# Patient Record
Sex: MALE | Race: WHITE | NOT HISPANIC OR LATINO | URBAN - METROPOLITAN AREA
[De-identification: names, ages, dates, MRNs, and addresses within clinical notes are randomized per-mention and may not be internally consistent; named-entity substitution may affect disease eponyms.]

---

## 2020-02-15 ENCOUNTER — EMERGENCY (EMERGENCY)
Facility: HOSPITAL | Age: 50
LOS: 1 days | Discharge: HOME | End: 2020-02-15
Attending: EMERGENCY MEDICINE
Payer: SELF-PAY

## 2020-02-15 VITALS
DIASTOLIC BLOOD PRESSURE: 87 MMHG | WEIGHT: 160.06 LBS | RESPIRATION RATE: 20 BRPM | SYSTOLIC BLOOD PRESSURE: 144 MMHG | HEART RATE: 80 BPM | OXYGEN SATURATION: 97 % | TEMPERATURE: 97 F | HEIGHT: 69 IN

## 2020-02-15 VITALS
OXYGEN SATURATION: 97 % | HEART RATE: 60 BPM | RESPIRATION RATE: 18 BRPM | TEMPERATURE: 98 F | SYSTOLIC BLOOD PRESSURE: 137 MMHG | DIASTOLIC BLOOD PRESSURE: 81 MMHG

## 2020-02-15 LAB
ALBUMIN SERPL ELPH-MCNC: 4.5 G/DL — SIGNIFICANT CHANGE UP (ref 3.3–5.2)
ALP SERPL-CCNC: 51 U/L — SIGNIFICANT CHANGE UP (ref 40–120)
ALT FLD-CCNC: 14 U/L — SIGNIFICANT CHANGE UP
ANION GAP SERPL CALC-SCNC: 13 MMOL/L — SIGNIFICANT CHANGE UP (ref 5–17)
APTT BLD: 30.2 SEC — SIGNIFICANT CHANGE UP (ref 27.5–36.3)
AST SERPL-CCNC: 17 U/L — SIGNIFICANT CHANGE UP
BILIRUB SERPL-MCNC: 0.4 MG/DL — SIGNIFICANT CHANGE UP (ref 0.4–2)
BUN SERPL-MCNC: 10 MG/DL — SIGNIFICANT CHANGE UP (ref 8–20)
CALCIUM SERPL-MCNC: 9.5 MG/DL — SIGNIFICANT CHANGE UP (ref 8.6–10.2)
CHLORIDE SERPL-SCNC: 100 MMOL/L — SIGNIFICANT CHANGE UP (ref 98–107)
CO2 SERPL-SCNC: 25 MMOL/L — SIGNIFICANT CHANGE UP (ref 22–29)
CREAT SERPL-MCNC: 0.65 MG/DL — SIGNIFICANT CHANGE UP (ref 0.5–1.3)
GLUCOSE SERPL-MCNC: 101 MG/DL — HIGH (ref 70–99)
HCT VFR BLD CALC: 53.2 % — HIGH (ref 39–50)
HGB BLD-MCNC: 17.1 G/DL — HIGH (ref 13–17)
INR BLD: 0.97 RATIO — SIGNIFICANT CHANGE UP (ref 0.88–1.16)
LACTATE BLDV-MCNC: 1.2 MMOL/L — SIGNIFICANT CHANGE UP (ref 0.5–2)
MCHC RBC-ENTMCNC: 30.3 PG — SIGNIFICANT CHANGE UP (ref 27–34)
MCHC RBC-ENTMCNC: 32.1 GM/DL — SIGNIFICANT CHANGE UP (ref 32–36)
MCV RBC AUTO: 94.3 FL — SIGNIFICANT CHANGE UP (ref 80–100)
NT-PROBNP SERPL-SCNC: 47 PG/ML — SIGNIFICANT CHANGE UP (ref 0–300)
PLATELET # BLD AUTO: 222 K/UL — SIGNIFICANT CHANGE UP (ref 150–400)
POTASSIUM SERPL-MCNC: 4.8 MMOL/L — SIGNIFICANT CHANGE UP (ref 3.5–5.3)
POTASSIUM SERPL-SCNC: 4.8 MMOL/L — SIGNIFICANT CHANGE UP (ref 3.5–5.3)
PROT SERPL-MCNC: 7.1 G/DL — SIGNIFICANT CHANGE UP (ref 6.6–8.7)
PROTHROM AB SERPL-ACNC: 11 SEC — SIGNIFICANT CHANGE UP (ref 10–12.9)
RBC # BLD: 5.64 M/UL — SIGNIFICANT CHANGE UP (ref 4.2–5.8)
RBC # FLD: 12.4 % — SIGNIFICANT CHANGE UP (ref 10.3–14.5)
SODIUM SERPL-SCNC: 138 MMOL/L — SIGNIFICANT CHANGE UP (ref 135–145)
TROPONIN T SERPL-MCNC: <0.01 NG/ML — SIGNIFICANT CHANGE UP (ref 0–0.06)
WBC # BLD: 5.46 K/UL — SIGNIFICANT CHANGE UP (ref 3.8–10.5)
WBC # FLD AUTO: 5.46 K/UL — SIGNIFICANT CHANGE UP (ref 3.8–10.5)

## 2020-02-15 PROCEDURE — 73630 X-RAY EXAM OF FOOT: CPT | Mod: 26,LT

## 2020-02-15 PROCEDURE — 36415 COLL VENOUS BLD VENIPUNCTURE: CPT

## 2020-02-15 PROCEDURE — 99284 EMERGENCY DEPT VISIT MOD MDM: CPT | Mod: 25

## 2020-02-15 PROCEDURE — G0378: CPT

## 2020-02-15 PROCEDURE — 96375 TX/PRO/DX INJ NEW DRUG ADDON: CPT

## 2020-02-15 PROCEDURE — 93971 EXTREMITY STUDY: CPT

## 2020-02-15 PROCEDURE — 80053 COMPREHEN METABOLIC PANEL: CPT

## 2020-02-15 PROCEDURE — 86140 C-REACTIVE PROTEIN: CPT

## 2020-02-15 PROCEDURE — 84484 ASSAY OF TROPONIN QUANT: CPT

## 2020-02-15 PROCEDURE — 85730 THROMBOPLASTIN TIME PARTIAL: CPT

## 2020-02-15 PROCEDURE — 96365 THER/PROPH/DIAG IV INF INIT: CPT

## 2020-02-15 PROCEDURE — 85027 COMPLETE CBC AUTOMATED: CPT

## 2020-02-15 PROCEDURE — 85610 PROTHROMBIN TIME: CPT

## 2020-02-15 PROCEDURE — 85652 RBC SED RATE AUTOMATED: CPT

## 2020-02-15 PROCEDURE — 83605 ASSAY OF LACTIC ACID: CPT

## 2020-02-15 PROCEDURE — 93971 EXTREMITY STUDY: CPT | Mod: 26,LT

## 2020-02-15 PROCEDURE — 87040 BLOOD CULTURE FOR BACTERIA: CPT

## 2020-02-15 PROCEDURE — 73630 X-RAY EXAM OF FOOT: CPT

## 2020-02-15 PROCEDURE — 83880 ASSAY OF NATRIURETIC PEPTIDE: CPT

## 2020-02-15 PROCEDURE — 99218: CPT

## 2020-02-15 RX ORDER — CEPHALEXIN 500 MG
1 CAPSULE ORAL
Qty: 28 | Refills: 0
Start: 2020-02-15 | End: 2020-02-21

## 2020-02-15 RX ORDER — PIPERACILLIN AND TAZOBACTAM 4; .5 G/20ML; G/20ML
3.38 INJECTION, POWDER, LYOPHILIZED, FOR SOLUTION INTRAVENOUS ONCE
Refills: 0 | Status: COMPLETED | OUTPATIENT
Start: 2020-02-15 | End: 2020-02-15

## 2020-02-15 RX ORDER — KETOROLAC TROMETHAMINE 30 MG/ML
15 SYRINGE (ML) INJECTION ONCE
Refills: 0 | Status: DISCONTINUED | OUTPATIENT
Start: 2020-02-15 | End: 2020-02-15

## 2020-02-15 RX ORDER — CEPHALEXIN 500 MG
500 CAPSULE ORAL ONCE
Refills: 0 | Status: COMPLETED | OUTPATIENT
Start: 2020-02-15 | End: 2020-02-15

## 2020-02-15 RX ORDER — NICOTINE POLACRILEX 2 MG
1 GUM BUCCAL DAILY
Refills: 0 | Status: DISCONTINUED | OUTPATIENT
Start: 2020-02-15 | End: 2020-02-22

## 2020-02-15 RX ORDER — IBUPROFEN 200 MG
600 TABLET ORAL EVERY 6 HOURS
Refills: 0 | Status: DISCONTINUED | OUTPATIENT
Start: 2020-02-15 | End: 2020-02-22

## 2020-02-15 RX ORDER — CEFAZOLIN SODIUM 1 G
1000 VIAL (EA) INJECTION EVERY 8 HOURS
Refills: 0 | Status: DISCONTINUED | OUTPATIENT
Start: 2020-02-15 | End: 2020-02-22

## 2020-02-15 RX ADMIN — PIPERACILLIN AND TAZOBACTAM 3.38 GRAM(S): 4; .5 INJECTION, POWDER, LYOPHILIZED, FOR SOLUTION INTRAVENOUS at 13:19

## 2020-02-15 RX ADMIN — Medication 1 PATCH: at 12:49

## 2020-02-15 RX ADMIN — Medication 15 MILLIGRAM(S): at 13:04

## 2020-02-15 RX ADMIN — Medication 15 MILLIGRAM(S): at 12:49

## 2020-02-15 RX ADMIN — Medication 500 MILLIGRAM(S): at 19:03

## 2020-02-15 RX ADMIN — Medication 600 MILLIGRAM(S): at 18:42

## 2020-02-15 RX ADMIN — PIPERACILLIN AND TAZOBACTAM 200 GRAM(S): 4; .5 INJECTION, POWDER, LYOPHILIZED, FOR SOLUTION INTRAVENOUS at 12:49

## 2020-02-15 NOTE — ED CDU PROVIDER DISPOSITION NOTE - NSFOLLOWUPINSTRUCTIONS_ED_ALL_ED_FT
Cellulitis    Cellulitis is a skin infection caused by bacteria. This condition occurs most often in the arms and lower legs but can occur anywhere over the body. Symptoms include redness, swelling, warm skin, tenderness, and chills/fever. If you were prescribed an antibiotic medicine, take it as told by your health care provider. Do not stop taking the antibiotic even if you start to feel better.    SEEK IMMEDIATE MEDICAL CARE IF YOU HAVE ANY OF THE FOLLOWING SYMPTOMS: worsening fever, red streaks coming from affected area, vomiting or diarrhea, or dizziness/lightheadedness.     Please follow up with your doctor within 48 hours.

## 2020-02-15 NOTE — ED CDU PROVIDER INITIAL DAY NOTE - ATTENDING CONTRIBUTION TO CARE
I agree with the PA's note and was available for any issues/concerns. I was directly involved in patient care. My brief overall assessment is as follows: agree with note, saw pt with PA, with several weeks of pain/swelling/mild erythema to LLE sent by ?podiatrist for eval. doppler/xr neg, crp/esr/wbc/lactate wnl. neurovascularly intact. with some swelling/erythema/blanching rash to foot, ?mild cellulitis. getting abx, will d/c with oral abx.

## 2020-02-15 NOTE — ED CDU PROVIDER INITIAL DAY NOTE - MEDICAL DECISION MAKING DETAILS
50 y/o male placed in observation for LLE cellulitis, doppler negative for DVT, will administer IV abx, likely d/c with PO abx

## 2020-02-15 NOTE — ED CDU PROVIDER INITIAL DAY NOTE - OBJECTIVE STATEMENT
48 y/o M with no known past medical history presents sent in by his podiatrist (Dr. Reilly) for 1 week of left foot swelling, erythema, warmth and pain.  Today he saw the podiatrist who recommended that he come to the ED for further evaluation of the unilateral LE swelling, erythema and edema, but prescribed Keflex anyway, but the patient has not yet started it as he came straight here. He denies fevers, chills, nausea, vomiting, chest pain, SOB, numbness or weakness. He does not have a PMD.

## 2020-02-15 NOTE — ED PROVIDER NOTE - PHYSICAL EXAMINATION
Const: Awake, alert and oriented. In no acute distress. Disheveled, unkempt. Appears older than stated age. Smells strongly of cigarettes.  HEENT: NC/AT. Moist mucous membranes.  Eyes: No scleral icterus. EOMI.  Neck:. Soft and supple. Full ROM without pain.  Cardiac: Regular rate and regular rhythm. +S1/S2. No murmurs. Peripheral pulses 2+ and symmetric. + pitting left LE edema from toes to mid calf.  Resp: Speaking in full sentences. No evidence of respiratory distress. No wheezes, rales or rhonchi.  Abd: Soft, non-tender, non-distended. Normal bowel sounds in all 4 quadrants. No guarding or rebound.  Back: Spine midline and non-tender. No CVAT.  Skin: Erythema, warmth to the left foot from the toes to the mid calf. Foul smelling foot. No abrasions or lacerations.  MSK: + TTP at the left lateral heel. No posterior calf TTP.  Neuro: Awake, alert & oriented x 3. Moves all extremities symmetrically.

## 2020-02-15 NOTE — ED ADULT NURSE REASSESSMENT NOTE - NS ED NURSE REASSESS COMMENT FT1
report given to OBS RN
Assumed care of the patient at 1630. Verbal report received from [facility]. Patient transferred to observation unit CDU 9. Patient A&Ox4. No s/s of distress. LLE +redness, +swelling, +warmth  to touch. Periph pulses+. Full ROM. VSS, afebrile. Patient pending IV abx. Patient in understanding of plan of care. Patient with no further questions for the RN. Resting in comfort. Call bell within reach and encouraged to use when assistance needed. Will continue to monitor.

## 2020-02-15 NOTE — ED STATDOCS - NS_ ATTENDINGSCRIBEDETAILS _ED_A_ED_FT
I, Tawanda Garcia, performed the initial face to face bedside interview with this patient regarding history of present illness, review of symptoms and relevant past medical, social and family history.  I completed an independent physical examination.    The history, relevant review of systems, past medical and surgical history, medical decision making, and physical examination was documented by the scribe in my presence and I attest to the accuracy of the documentation.

## 2020-02-15 NOTE — ED ADULT TRIAGE NOTE - CHIEF COMPLAINT QUOTE
50 y/o male c/o left foot swelling, redness and pain x one month, seen at podiatrist this morning and wife states was told to come here to rule out "vascular issue in his chest"

## 2020-02-15 NOTE — ED STATDOCS - PROGRESS NOTE DETAILS
49 year old male with no significant past medical history presents to the ED for evaluation of left foot swelling, redness and pain which has been ongoing for one month; pt was seen at his podiatrist's (Dr. Reilly) this morning; bone spurs were found at that time and pt was referred to the ED to rule out any clots or vascular complications. Current smoker, smokes about 2 packs a day for 20 years. Heavy ETOH drinker. Denies cough, shortness of breath. Focused eval, protocol orders entered. Pt to be moved to main ED for further evaluation by another provider.

## 2020-02-15 NOTE — ED ADULT NURSE REASSESSMENT NOTE - COMFORT CARE
ambulated to bathroom/repositioned/plan of care explained/side rails up/wait time explained/meal provided/po fluids offered

## 2020-02-15 NOTE — ED CDU PROVIDER DISPOSITION NOTE - PATIENT PORTAL LINK FT
You can access the FollowMyHealth Patient Portal offered by [facility] by registering at the following website: [email or web address]. By joining [employer] [employer]’s FollowMyHealth portal, you will also be able to view your health information using other applications (apps) compatible with our system.

## 2020-02-15 NOTE — ED CDU PROVIDER INITIAL DAY NOTE - PHYSICAL EXAMINATION
LLE: + minimal erythema to dorsum of left foot with edema, DP intact b/l, no open wounds , no calf TTP, compartments are soft

## 2020-02-15 NOTE — ED CDU PROVIDER DISPOSITION NOTE - CLINICAL COURSE
48 y/o male placed in observation for cellulitis. doppler negative for DVT. received IV Zosyn. No systemic symptoms, tolerating PO. normal wbc count. will place on PO abx with outpatient follow up.

## 2020-02-15 NOTE — ED ADULT NURSE NOTE - OBJECTIVE STATEMENT
50 y/o male that came to the ED for c/o left foot swelling, redness and pain x one month, seen at podiatrist this morning and wife states was told to come here to rule out "vascular issue in his chest" 48 y/o male that came to the ED for c/o left foot swelling, redness and pain x one month, seen at podiatrist this morning and wife states was told to come here to rule out "vascular issue in his chest". pt is A/Ox3.

## 2020-02-15 NOTE — ED ADULT NURSE NOTE - CAS ELECT INFOMATION PROVIDED
DC instructions provide by CASSIDY Arias. Patient with no further questions for the RN./DC instructions

## 2020-02-15 NOTE — ED ADULT NURSE NOTE - CHIEF COMPLAINT QUOTE
48 y/o male c/o left foot swelling, redness and pain x one month, seen at podiatrist this morning and wife states was told to come here to rule out "vascular issue in his chest"

## 2020-02-15 NOTE — ED PROVIDER NOTE - OBJECTIVE STATEMENT
50 y/o M with no PMH (he does not see doctors regularly) presents sent in by his podiatrist (Dr. Reilly) for 1 week of left foot swelling, erythema, warmth and pain. He notes pain to the lateral heel of the left foot and denies injury. His aide with him states that he had a similar problem on the right foot about a year ago which improved with bed rest. She noted some foul smelling coming for the left foot earlier this week as well. She states that he sits in the garage all day and all night to chain smoke and drinks 6-8 beers nightly. Today he saw the podiatrist who recommended that he come to the ED for further evaluation of the unilateral LE swelling, erythema and edema, but prescribed Keflex anyway, but the patient has not yet started it as he came straight here. He denies fevers, chills, nausea, vomiting, chest pain, SOB, numbness or weakness. He does not have a PMD.

## 2020-02-15 NOTE — ED PROVIDER NOTE - CLINICAL SUMMARY MEDICAL DECISION MAKING FREE TEXT BOX
50 y/o M with no PMH because he never sees doctors presents for evaluation of 1 week of L LE edema, erythema and foul smell. No wound, patient has not yet started ABx. Afebrile, + edema, erythema, warmth and tenderness of left foot streaking up left leg consistent with cellulitis. Will obtain XR, venous doppler, cultures, basic labs, give IV Abx, consult podiatry as needed and plan for observation for cellulitis of the foot.

## 2020-02-15 NOTE — ED PROVIDER NOTE - NS ED ROS FT
Const: Denies fever, chills  HEENT: Denies blurry vision, sore throat  Neck: Denies neck pain/stiffness  Resp: Denies coughing, SOB  Cardiovascular: Denies CP, palpitations  GI: Denies nausea, vomiting, abdominal pain, diarrhea, constipation, blood in stool  : Denies urinary frequency/urgency/dysuria, hematuria  MSK: + Left foot pain, + Left foot edema. Denies back pain  Neuro: Denies HA, dizziness, numbness, weakness  Skin: + rashes.

## 2020-02-15 NOTE — ED CDU PROVIDER DISPOSITION NOTE - ATTENDING CONTRIBUTION TO CARE
I agree with the PA's note and was available for any issues/concerns. I was directly involved in patient care. My brief overall assessment is as follows: agree with note, pt d/danny with abx, continued care. well appearing.

## 2020-02-18 PROBLEM — Z00.00 ENCOUNTER FOR PREVENTIVE HEALTH EXAMINATION: Status: ACTIVE | Noted: 2020-02-18

## 2020-02-19 ENCOUNTER — APPOINTMENT (OUTPATIENT)
Dept: FAMILY MEDICINE | Facility: CLINIC | Age: 50
End: 2020-02-19
Payer: SELF-PAY

## 2020-02-19 VITALS
DIASTOLIC BLOOD PRESSURE: 60 MMHG | SYSTOLIC BLOOD PRESSURE: 110 MMHG | BODY MASS INDEX: 23.55 KG/M2 | TEMPERATURE: 97.7 F | OXYGEN SATURATION: 98 % | HEART RATE: 82 BPM | HEIGHT: 69 IN | WEIGHT: 159 LBS

## 2020-02-19 DIAGNOSIS — F10.10 ALCOHOL ABUSE, UNCOMPLICATED: ICD-10-CM

## 2020-02-19 DIAGNOSIS — Z78.9 OTHER SPECIFIED HEALTH STATUS: ICD-10-CM

## 2020-02-19 DIAGNOSIS — Z72.0 TOBACCO USE: ICD-10-CM

## 2020-02-19 DIAGNOSIS — L03.116 CELLULITIS OF LEFT LOWER LIMB: ICD-10-CM

## 2020-02-19 PROCEDURE — G0442 ANNUAL ALCOHOL SCREEN 15 MIN: CPT

## 2020-02-19 PROCEDURE — 99495 TRANSJ CARE MGMT MOD F2F 14D: CPT | Mod: 25

## 2020-02-19 PROCEDURE — 99406 BEHAV CHNG SMOKING 3-10 MIN: CPT

## 2020-02-19 PROCEDURE — 99214 OFFICE O/P EST MOD 30 MIN: CPT | Mod: 25

## 2020-02-19 RX ORDER — CEPHALEXIN 500 MG/1
500 CAPSULE ORAL 4 TIMES DAILY
Qty: 64 | Refills: 0 | Status: ACTIVE | COMMUNITY
Start: 2020-02-19

## 2020-02-19 NOTE — COUNSELING
[Behavioral health counseling provided] : Behavioral health counseling provided [Sleep ___ hours/day] : Sleep [unfilled] hours/day [Engage in a relaxing activity] : Engage in a relaxing activity [Cessation strategies including cessation program discussed] : Cessation strategies including cessation program discussed [Willing to Quit Smoking] : Not willing to quit smoking [Risk of tobacco use and health benefits of smoking cessation discussed] : Risk of tobacco use and health benefits of smoking cessation discussed [Tobacco Use Cessation Intermediate Greater Than 3 Minutes Up to 10 Minutes] : Tobacco Use Cessation Intermediate Greater Than 3 Minutes Up to 10 Minutes [AUDIT-C Screening administered and reviewed] : AUDIT-C Screening administered and reviewed [FreeTextEntry1] : 5 [Hazards of at-risk alcohol use discussed] : Hazards of at-risk alcohol use discussed [Strategies to reduce or eliminate alcohol use discussed] : Strategies to reduce or eliminate alcohol use discussed [FreeTextEntry2] : pt agrees to drink only one beer a night  [Support options provided] : Support options provided [Patient motivation] : Patient motivation [de-identified] : pt has unhealthy life style including poor [place]  \par he understands the hazards to his health and is beginning to contemplate making healthy changes.   [Good understanding] : Patient has a good understanding of lifestyle changes and steps needed to achieve self management goal

## 2020-02-19 NOTE — ASSESSMENT
[FreeTextEntry1] : imp: 48 y/o M seen today for post hospital ER visit with no primary care found to have resolving Cellulitis of the Left foot, with continuous tobacco abuse and is Drinking Alcohol to excess daily. \par plan: Engage in Primary care \par CPE next visit \par Reviewed labs and radiology from the ER \par Primary care labs next visit \par continue to educate 2/2 smoking and ETOH abuse. consider offering  formal referral. \par pt is at high risk for COPD and Cardiovascular disease.

## 2020-02-19 NOTE — HISTORY OF PRESENT ILLNESS
[Post-hospitalization from ___ Hospital] : Post-hospitalization from [unfilled] Hospital [Admitted on: ___] : The patient was admitted on [unfilled] [Discharged on ___] : discharged on [unfilled] [Discharge Summary] : discharge summary [Radiology Findings] : radiology findings [Pertinent Labs] : pertinent labs [Discharge Med List] : discharge medication list [Med Reconciliation] : medication reconciliation has been completed [FreeTextEntry2] : Kwaku was seen by his podiatrist last Saturday for swollen red and tender L foot he was sent to the ER where he was tx for cellulitis with IV abx and started on oral abx and discharged home. he has no pcp and dose not seek health care often. \par He had the same symptoms to the R foot one year ago. \par

## 2020-02-19 NOTE — HEALTH RISK ASSESSMENT
[Intercurrent ED visits] : went to ED [] : Yes [26-29] : 26-38 [Yes] : Yes [4 or more  times a week (4 pts)] : 4 or more  times a week (4 points) [3 or 4 (1 pt)] : 3 or 4  (1 point) [Monthly (2 pts)] : Monthly (2 points) [No falls in past year] : Patient reported no falls in the past year [0] : 2) Feeling down, depressed, or hopeless: Not at all (0) [With Significant Other] : lives with significant other [# of Members in Household ___] :  household currently consist of [unfilled] member(s) [Employed] : employed [High School] : high school [] :  [# Of Children ___] : has [unfilled] children [Patient/Caregiver not ready to engage] : Patient/Caregiver not ready to engage [Audit-CScore] : 7 [de-identified] : [employer]  [de-identified] : work other than that sedentary  [[id number]] : 0 [HIV test declined] : HIV test declined [Hepatitis C test declined] : Hepatitis C test declined [Reports changes in hearing] : Reports no changes in hearing [Reports changes in vision] : Reports no changes in vision [Change in mental status noted] : No change in mental status noted [Reports changes in dental health] : Reports no changes in dental health [Reports normal functional visual acuity (ie: able to read med bottle)] : Reports poor functional visual acuity.  [FreeTextEntry4] : spouse and pt agree to discuss this and will formalize at CPE visit   [ColonoscopyComments] : never

## 2020-02-19 NOTE — PHYSICAL EXAM
[No Acute Distress] : no acute distress [Well Nourished] : well nourished [Well Developed] : well developed [Well-Appearing] : well-appearing [Normal Sclera/Conjunctiva] : normal sclera/conjunctiva [PERRL] : pupils equal round and reactive to light [EOMI] : extraocular movements intact [Normal Oropharynx] : the oropharynx was normal [Normal Outer Ear/Nose] : the outer ears and nose were normal in appearance [No Lymphadenopathy] : no lymphadenopathy [No JVD] : no jugular venous distention [Supple] : supple [No Respiratory Distress] : no respiratory distress  [Thyroid Normal, No Nodules] : the thyroid was normal and there were no nodules present [No Accessory Muscle Use] : no accessory muscle use [Clear to Auscultation] : lungs were clear to auscultation bilaterally [Regular Rhythm] : with a regular rhythm [Normal Rate] : normal rate  [Normal S1, S2] : normal S1 and S2 [No Carotid Bruits] : no carotid bruits [No Murmur] : no murmur heard [No Abdominal Bruit] : a ~M bruit was not heard ~T in the abdomen [No Varicosities] : no varicosities [Pedal Pulses Present] : the pedal pulses are present [No Edema] : there was no peripheral edema [No Palpable Aorta] : no palpable aorta [No Extremity Clubbing/Cyanosis] : no extremity clubbing/cyanosis [Soft] : abdomen soft [Non Tender] : non-tender [Non-distended] : non-distended [No Masses] : no abdominal mass palpated [No HSM] : no HSM [Normal Posterior Cervical Nodes] : no posterior cervical lymphadenopathy [Normal Bowel Sounds] : normal bowel sounds [No CVA Tenderness] : no CVA  tenderness [Normal Anterior Cervical Nodes] : no anterior cervical lymphadenopathy [No Spinal Tenderness] : no spinal tenderness [No Joint Swelling] : no joint swelling [Grossly Normal Strength/Tone] : grossly normal strength/tone [No Rash] : no rash [Coordination Grossly Intact] : coordination grossly intact [No Focal Deficits] : no focal deficits [Normal Gait] : normal gait [Deep Tendon Reflexes (DTR)] : deep tendon reflexes were 2+ and symmetric [Normal Insight/Judgement] : insight and judgment were intact [Normal Affect] : the affect was normal [de-identified] : The left foot is healing well with no warmth and resolving erythema and swelling is minimal  [84909 - Moderate Complexity requires multiple possible diagnoses and/or the management options, moderate complexity of the medical data (tests, etc.) to be reviewed, and moderate risk of significant complications, morbidity, and/or mortality as well as co] : Moderate Complexity

## 2020-02-20 LAB
CULTURE RESULTS: SIGNIFICANT CHANGE UP
CULTURE RESULTS: SIGNIFICANT CHANGE UP
SPECIMEN SOURCE: SIGNIFICANT CHANGE UP
SPECIMEN SOURCE: SIGNIFICANT CHANGE UP

## 2020-04-06 ENCOUNTER — APPOINTMENT (OUTPATIENT)
Dept: FAMILY MEDICINE | Facility: CLINIC | Age: 50
End: 2020-04-06

## 2024-04-10 DIAGNOSIS — Z12.11 ENCOUNTER FOR SCREENING FOR MALIGNANT NEOPLASM OF COLON: ICD-10-CM
